# Patient Record
Sex: FEMALE | Race: WHITE | URBAN - METROPOLITAN AREA
[De-identification: names, ages, dates, MRNs, and addresses within clinical notes are randomized per-mention and may not be internally consistent; named-entity substitution may affect disease eponyms.]

---

## 2017-01-20 ENCOUNTER — OFFICE VISIT (OUTPATIENT)
Dept: URGENT CARE | Facility: URGENT CARE | Age: 52
End: 2017-01-20
Payer: COMMERCIAL

## 2017-01-20 VITALS
WEIGHT: 168.5 LBS | SYSTOLIC BLOOD PRESSURE: 132 MMHG | BODY MASS INDEX: 24.96 KG/M2 | TEMPERATURE: 98.1 F | HEART RATE: 92 BPM | OXYGEN SATURATION: 98 % | HEIGHT: 69 IN | DIASTOLIC BLOOD PRESSURE: 80 MMHG

## 2017-01-20 DIAGNOSIS — R20.0 NUMBNESS AND TINGLING: Primary | ICD-10-CM

## 2017-01-20 DIAGNOSIS — R20.2 NUMBNESS AND TINGLING: Primary | ICD-10-CM

## 2017-01-20 PROCEDURE — 99213 OFFICE O/P EST LOW 20 MIN: CPT | Performed by: FAMILY MEDICINE

## 2017-01-20 NOTE — NURSING NOTE
"Chief Complaint   Patient presents with     Musculoskeletal Problem     both foot, tingiling, foot pain,numbness  1x month        Initial /80 mmHg  Pulse 92  Temp(Src) 98.1  F (36.7  C) (Oral)  Ht 5' 9\" (1.753 m)  Wt 168 lb 8 oz (76.431 kg)  BMI 24.87 kg/m2  SpO2 98% Estimated body mass index is 24.87 kg/(m^2) as calculated from the following:    Height as of this encounter: 5' 9\" (1.753 m).    Weight as of this encounter: 168 lb 8 oz (76.431 kg).  BP completed using cuff size: regular    "

## 2017-01-20 NOTE — PROGRESS NOTES
SUBJECTIVE:    CC: Wilton Wade is a 51 year old female who presents for tingling in both feet.     HPI: had edema a month ago then had a massage with dissipation but tinging ever since. Discussed local trauma vs tarsal tunnel        PROBLEM LIST:                   Patient Active Problem List   Diagnosis     Anxiety     Moderate persistent asthma     Allergic rhinitis     CARDIOVASCULAR SCREENING; LDL GOAL LESS THAN 160     Hypertension       PAST MEDICAL HISTORY:                  Past Medical History   Diagnosis Date     Asthma      IBS (irritable bowel syndrome)      Anxiety      Allergic rhinitis, cause unspecified      Allergic rhinitis       PAST SURGICAL HISTORY:                  Past Surgical History   Procedure Laterality Date     Laminectomy lumbar one level  1995     Ankle surgery  1984     Thyroid surgery  2007       CURRENT MEDICATIONS:                  Current Outpatient Prescriptions   Medication Sig Dispense Refill     ALPRAZolam (XANAX) 1 MG tablet Take 1/2 tablet as needed every 12 hours 30 tablet 0     hydrOXYzine (ATARAX) 25 MG tablet Take 1 tablet (25 mg) by mouth every 8 hours as needed for anxiety 60 tablet 1     citalopram (CELEXA) 20 MG tablet Take 2 tablets (40 mg) by mouth daily Needs office visit before further refills 90 tablet 1     mometasone-formoterol (DULERA) 200-5 MCG/ACT oral inhaler Inhale 2 puffs into the lungs 2 times daily 1 Inhaler 6     albuterol (ALBUTEROL) 108 (90 BASE) MCG/ACT inhaler Inhale 2 puffs into the lungs every 6 hours as needed 1 Inhaler 5     hydrochlorothiazide (HYDRODIURIL) 25 MG tablet Take 1 tablet (25 mg) by mouth daily 90 tablet 1             FAMILY HISTORY:                   Family History   Problem Relation Age of Onset     Cardiovascular Mother      DIABETES Father      Cardiovascular Maternal Grandfather        HEALTH MAINTENANCE:                    REVIEW OF OUTSIDE RECORDS: NO    REVIEW OF SYSTEMS:  C: NEGATIVE for fever, chills  E: NEGATIVE for  "vision changes   R: NEGATIVE for significant cough or SOB  CV: NEGATIVE for chest pain, palpitations   GI: NEGATIVE for nausea, abdominal pain, heartburn, or change in bowel habits  : NEGATIVE for frequency, dysuria, or hematuria  M: NEGATIVE for significant arthralgias or myalgia  N: NEGATIVE for weakness, dizziness or paresthesias or headache  NVS:no headache or balance issus  INTEG:no moles or new rashes  LYMPH:no nodes or night sweats    EXAM:    /80 mmHg  Pulse 92  Temp(Src) 98.1  F (36.7  C) (Oral)  Ht 5' 9\" (1.753 m)  Wt 168 lb 8 oz (76.431 kg)  BMI 24.87 kg/m2  SpO2 98%  GENERAL APPEARANCE: healthy, alert and no distress   EXAM:  GENERAL APPEARANCE: healthy, alert and no distress  EYES: EOMI,  PERRL  HENT: ear canals and TM's normal and nose and mouth without ulcers or lesions  RESP: lungs clear to auscultation - no rales, rhonchi or wheezes  CV: regular rates and rhythm, normal S1 S2, no S3 or S4 and no murmur, click or rub -  ABDOMEN:  soft, nontender, no HSM or masses and bowel sounds normal  BACK:no tenderness or pain on straight let raise  Symmetrical dtrs         ASSESSMENT/PLAN  Bilateral foot dysaesthesia with no back pain, bowel or bladder issues or history of trauma    (R20.0,  R20.2) Numbness and tingling  (primary encounter diagnosis)  Comment: had potential remote trauma  Plan: sees Podiatry on Monday                                    "

## 2017-01-20 NOTE — PATIENT INSTRUCTIONS
Ibuprofen 600 three times daily    See podiatrist , they can diagnose neuropathy or tarasal shannan,    Warm water soads or moderately hot may feel soothing ,  Consider epsom salts

## 2017-01-20 NOTE — MR AVS SNAPSHOT
"              After Visit Summary   1/20/2017    Wilton Wade    MRN: [id number]           Patient Information     Date Of Birth          1965        Visit Information        Provider Department      1/20/2017 4:45 PM Johny Roth MD [facility]        Today's Diagnoses     Numbness and tingling    -  1       Care Instructions    Ibuprofen 600 three times daily    See podiatrist , they can diagnose neuropathy or tarasal shannan,    Warm water soads or moderately hot may feel soothing ,  Consider epsom salts         Follow-ups after your visit        Who to contact     If you have questions or need follow up information about today's clinic visit or your schedule please contact [facility] directly at [phone].  Normal or non-critical lab and imaging results will be communicated to you by [employer], letter or phone within 4 business days after the clinic has received the results. If you do not hear from us within 7 days, please contact the clinic through MyChart or phone. If you have a critical or abnormal lab result, we will notify you by phone as soon as possible.  Submit refill requests through [employer] or call your pharmacy and they will forward the refill request to us. Please allow 3 business days for your refill to be completed.          Additional Information About Your Visit        [employer] Information     [employer] lets you send messages to your doctor, view your test results, renew your prescriptions, schedule appointments and more. To sign up, go to [email or web address] . Click on \"Log in\" on the left side of the screen, which will take you to the Welcome page. Then click on \"Sign up Now\" on the right side of the page.     You will be asked to enter the access code listed below, as well as some personal information. Please follow the directions to create your username and password.     Your access code is: [email or web address]  Expires: " "2017  5:56 PM     Your access code will  in 90 days. If you need help or a new code, please call your [facility] clinic or [phone].        Care EveryWhere ID     This is your Care EveryWhere ID. This could be used by other organizations to access your [facility] medical records  [id number]        Your Vitals Were     Pulse Temperature Height BMI (Body Mass Index) Pulse Oximetry       92 98.1  F (36.7  C) (Oral) 5' 9\" (1.753 m) 24.87 kg/m2 98%        Blood Pressure from Last 3 Encounters:   17 132/80   07/17/15 136/90   14 136/84    Weight from Last 3 Encounters:   17 168 lb 8 oz (76.431 kg)   07/17/15 164 lb (74.39 kg)   14 171 lb 3.2 oz (77.656 kg)              Today, you had the following     No orders found for display       Primary Care Provider Office Phone # Fax #    [facility] [phone]       [address] [place] [id number]        Thank you!     Thank you for choosing [facility]  for your care. Our goal is always to provide you with excellent care. Hearing back from our patients is one way we can continue to improve our services. Please take a few minutes to complete the written survey that you may receive in the mail after your visit with us. Thank you!             Your Updated Medication List - Protect others around you: Learn how to safely use, store and throw away your medicines at www.disposemymeds.org.          This list is accurate as of: 17  5:56 PM.  Always use your most recent med list.                   Brand Name Dispense Instructions for use    albuterol 108 (90 BASE) MCG/ACT Inhaler    albuterol    1 Inhaler    Inhale 2 puffs into the lungs every 6 hours as needed       ALPRAZolam 1 MG tablet    XANAX    30 tablet    Take 1/2 tablet as needed every 12 hours       citalopram 20 MG tablet    celeXA    90 tablet    Take 2 tablets (40 mg) by mouth daily Needs office visit before " further refills       hydrochlorothiazide 25 MG tablet    HYDRODIURIL    90 tablet    Take 1 tablet (25 mg) by mouth daily       hydrOXYzine 25 MG tablet    ATARAX    60 tablet    Take 1 tablet (25 mg) by mouth every 8 hours as needed for anxiety       mometasone-formoterol 200-5 MCG/ACT oral inhaler    DULERA    1 Inhaler    Inhale 2 puffs into the lungs 2 times daily

## 2020-01-31 ENCOUNTER — HOSPITAL ENCOUNTER (EMERGENCY)
Facility: CLINIC | Age: 55
Discharge: LEFT BEFORE DISCHARGE | End: 2020-01-31
Admitting: EMERGENCY MEDICINE

## 2020-01-31 VITALS
DIASTOLIC BLOOD PRESSURE: 89 MMHG | HEIGHT: 69 IN | OXYGEN SATURATION: 100 % | TEMPERATURE: 98.4 F | BODY MASS INDEX: 24.44 KG/M2 | RESPIRATION RATE: 16 BRPM | WEIGHT: 165 LBS | HEART RATE: 77 BPM | SYSTOLIC BLOOD PRESSURE: 158 MMHG

## 2020-01-31 PROCEDURE — 40000268 ZZH STATISTIC NO CHARGES

## 2020-01-31 ASSESSMENT — MIFFLIN-ST. JEOR: SCORE: 1417.82

## 2020-02-01 NOTE — ED NOTES
Patient came to the triage desk and told triage nurse that the pill that was in her throat has now gone down and she doesn't feel like she needs to be seen.